# Patient Record
Sex: MALE | Race: WHITE | NOT HISPANIC OR LATINO | Employment: FULL TIME | URBAN - METROPOLITAN AREA
[De-identification: names, ages, dates, MRNs, and addresses within clinical notes are randomized per-mention and may not be internally consistent; named-entity substitution may affect disease eponyms.]

---

## 2019-09-18 ENCOUNTER — HOSPITAL ENCOUNTER (EMERGENCY)
Facility: HOSPITAL | Age: 34
Discharge: HOME | End: 2019-09-18
Attending: EMERGENCY MEDICINE | Admitting: EMERGENCY MEDICINE

## 2019-09-18 ENCOUNTER — APPOINTMENT (OUTPATIENT)
Dept: GENERAL RADIOLOGY | Facility: HOSPITAL | Age: 34
End: 2019-09-18

## 2019-09-18 VITALS
DIASTOLIC BLOOD PRESSURE: 62 MMHG | SYSTOLIC BLOOD PRESSURE: 101 MMHG | RESPIRATION RATE: 18 BRPM | WEIGHT: 218 LBS | TEMPERATURE: 99 F | OXYGEN SATURATION: 95 % | BODY MASS INDEX: 30.52 KG/M2 | HEIGHT: 71 IN | HEART RATE: 74 BPM

## 2019-09-18 DIAGNOSIS — J18.9 PNEUMONIA OF LEFT LOWER LOBE DUE TO INFECTIOUS ORGANISM: Primary | ICD-10-CM

## 2019-09-18 DIAGNOSIS — R07.9 CHEST PAIN, UNSPECIFIED TYPE: ICD-10-CM

## 2019-09-18 LAB
ALBUMIN SERPL-MCNC: 5 G/DL (ref 3.5–5.2)
ALBUMIN/GLOB SERPL: 1.7 G/DL
ALP SERPL-CCNC: 62 U/L (ref 39–117)
ALT SERPL W P-5'-P-CCNC: 14 U/L (ref 1–41)
ANION GAP SERPL CALCULATED.3IONS-SCNC: 12 MMOL/L (ref 5–15)
AST SERPL-CCNC: 22 U/L (ref 1–40)
BASOPHILS # BLD AUTO: 0.03 10*3/MM3 (ref 0–0.2)
BASOPHILS NFR BLD AUTO: 0.4 % (ref 0–1.5)
BILIRUB SERPL-MCNC: 0.6 MG/DL (ref 0.2–1.2)
BUN BLD-MCNC: 11 MG/DL (ref 6–20)
BUN/CREAT SERPL: 10.8 (ref 7–25)
CALCIUM SPEC-SCNC: 9.5 MG/DL (ref 8.6–10.5)
CHLORIDE SERPL-SCNC: 103 MMOL/L (ref 98–107)
CO2 SERPL-SCNC: 26 MMOL/L (ref 22–29)
CREAT BLD-MCNC: 1.02 MG/DL (ref 0.76–1.27)
DEPRECATED RDW RBC AUTO: 42.8 FL (ref 37–54)
EOSINOPHIL # BLD AUTO: 0.1 10*3/MM3 (ref 0–0.4)
EOSINOPHIL NFR BLD AUTO: 1.5 % (ref 0.3–6.2)
ERYTHROCYTE [DISTWIDTH] IN BLOOD BY AUTOMATED COUNT: 12.9 % (ref 12.3–15.4)
GFR SERPL CREATININE-BSD FRML MDRD: 84 ML/MIN/1.73
GLOBULIN UR ELPH-MCNC: 2.9 GM/DL
GLUCOSE BLD-MCNC: 103 MG/DL (ref 65–99)
HCT VFR BLD AUTO: 43.5 % (ref 37.5–51)
HGB BLD-MCNC: 14.4 G/DL (ref 13–17.7)
HOLD SPECIMEN: NORMAL
HOLD SPECIMEN: NORMAL
IMM GRANULOCYTES # BLD AUTO: 0.02 10*3/MM3 (ref 0–0.05)
IMM GRANULOCYTES NFR BLD AUTO: 0.3 % (ref 0–0.5)
LIPASE SERPL-CCNC: 25 U/L (ref 13–60)
LYMPHOCYTES # BLD AUTO: 1.27 10*3/MM3 (ref 0.7–3.1)
LYMPHOCYTES NFR BLD AUTO: 19 % (ref 19.6–45.3)
MCH RBC QN AUTO: 30.1 PG (ref 26.6–33)
MCHC RBC AUTO-ENTMCNC: 33.1 G/DL (ref 31.5–35.7)
MCV RBC AUTO: 91 FL (ref 79–97)
MONOCYTES # BLD AUTO: 0.45 10*3/MM3 (ref 0.1–0.9)
MONOCYTES NFR BLD AUTO: 6.7 % (ref 5–12)
NEUTROPHILS # BLD AUTO: 4.81 10*3/MM3 (ref 1.7–7)
NEUTROPHILS NFR BLD AUTO: 72.1 % (ref 42.7–76)
NRBC BLD AUTO-RTO: 0 /100 WBC (ref 0–0.2)
NT-PROBNP SERPL-MCNC: 112.6 PG/ML (ref 5–450)
PLATELET # BLD AUTO: 243 10*3/MM3 (ref 140–450)
PMV BLD AUTO: 10.1 FL (ref 6–12)
POTASSIUM BLD-SCNC: 4.1 MMOL/L (ref 3.5–5.2)
PROT SERPL-MCNC: 7.9 G/DL (ref 6–8.5)
RBC # BLD AUTO: 4.78 10*6/MM3 (ref 4.14–5.8)
SODIUM BLD-SCNC: 141 MMOL/L (ref 136–145)
TROPONIN T SERPL-MCNC: <0.01 NG/ML (ref 0–0.03)
WBC NRBC COR # BLD: 6.68 10*3/MM3 (ref 3.4–10.8)
WHOLE BLOOD HOLD SPECIMEN: NORMAL
WHOLE BLOOD HOLD SPECIMEN: NORMAL

## 2019-09-18 PROCEDURE — 93005 ELECTROCARDIOGRAM TRACING: CPT | Performed by: EMERGENCY MEDICINE

## 2019-09-18 PROCEDURE — 71045 X-RAY EXAM CHEST 1 VIEW: CPT

## 2019-09-18 PROCEDURE — 84484 ASSAY OF TROPONIN QUANT: CPT | Performed by: EMERGENCY MEDICINE

## 2019-09-18 PROCEDURE — 83690 ASSAY OF LIPASE: CPT | Performed by: EMERGENCY MEDICINE

## 2019-09-18 PROCEDURE — 80053 COMPREHEN METABOLIC PANEL: CPT | Performed by: EMERGENCY MEDICINE

## 2019-09-18 PROCEDURE — 85025 COMPLETE CBC W/AUTO DIFF WBC: CPT | Performed by: EMERGENCY MEDICINE

## 2019-09-18 PROCEDURE — 83880 ASSAY OF NATRIURETIC PEPTIDE: CPT | Performed by: EMERGENCY MEDICINE

## 2019-09-18 PROCEDURE — 99284 EMERGENCY DEPT VISIT MOD MDM: CPT

## 2019-09-18 RX ORDER — CETIRIZINE HYDROCHLORIDE 10 MG/1
10 TABLET ORAL DAILY
COMMUNITY

## 2019-09-18 RX ORDER — SODIUM CHLORIDE 0.9 % (FLUSH) 0.9 %
10 SYRINGE (ML) INJECTION AS NEEDED
Status: DISCONTINUED | OUTPATIENT
Start: 2019-09-18 | End: 2019-09-18 | Stop reason: HOSPADM

## 2019-09-18 RX ORDER — ASPIRIN 81 MG/1
324 TABLET, CHEWABLE ORAL ONCE
Status: COMPLETED | OUTPATIENT
Start: 2019-09-18 | End: 2019-09-18

## 2019-09-18 RX ORDER — HYDROXYZINE HYDROCHLORIDE 25 MG/1
25 TABLET, FILM COATED ORAL 3 TIMES DAILY PRN
Qty: 15 TABLET | Refills: 0 | Status: SHIPPED | OUTPATIENT
Start: 2019-09-18

## 2019-09-18 RX ORDER — AZITHROMYCIN 250 MG/1
250 TABLET, FILM COATED ORAL DAILY
Qty: 6 TABLET | Refills: 0 | Status: SHIPPED | OUTPATIENT
Start: 2019-09-18

## 2019-09-18 RX ADMIN — ASPIRIN 81 MG 324 MG: 81 TABLET ORAL at 11:33

## 2019-09-18 NOTE — ED PROVIDER NOTES
Subjective   Carlos Glasgow is a 33 y.o male who presents to the ED with complaints of chest pain. He reports he began experiencing left-sided chest pain with an onset yesterday. He describes it as a tightness sensation. He also complains of anxiety, shortness of breath, generalized weakness, light-headedness, dizziness, myalgias, decreased appetite, and a numbness/tingling sensation in his hands. He denies fever, nausea, vomiting, diarrhea, cough, sore throat, congestion, urgency, vision changes, tinnitus, and headache. He has a history of anxiety attacks and states his symptoms feel somewhat similar. There are no other acute symptoms at this time.        History provided by:  Patient  Chest Pain   Pain location:  L chest  Pain quality: tightness    Pain radiates to:  Does not radiate  Pain severity:  Moderate  Onset quality:  Sudden  Timing:  Constant  Chronicity:  New  Associated symptoms: dizziness, numbness, shortness of breath and weakness (generalized)    Associated symptoms: no fever, no headache, no nausea and no vomiting    Dizziness:     Severity:  Mild    Timing:  Constant    Progression:  Unchanged  Shortness of breath:     Severity:  Mild    Onset quality:  Sudden    Timing:  Constant    Progression:  Unchanged  Weakness:     Severity:  Mild    Onset quality:  Sudden    Timing:  Constant    Progression:  Unchanged    Chronicity:  New      Review of Systems   Constitutional: Positive for appetite change (decreased). Negative for fever.   HENT: Negative for congestion, sore throat and tinnitus.    Eyes: Negative for visual disturbance.   Respiratory: Positive for chest tightness and shortness of breath.    Cardiovascular: Positive for chest pain.   Gastrointestinal: Negative for diarrhea, nausea and vomiting.   Genitourinary: Negative for urgency.   Musculoskeletal: Positive for myalgias.   Neurological: Positive for dizziness, weakness (generalized), light-headedness and numbness. Negative for  headaches.   Psychiatric/Behavioral: The patient is nervous/anxious.    All other systems reviewed and are negative.      Past Medical History:   Diagnosis Date   • Seasonal allergies        No Known Allergies    Past Surgical History:   Procedure Laterality Date   • CERVICAL FUSION         History reviewed. No pertinent family history.    Social History     Socioeconomic History   • Marital status:      Spouse name: Not on file   • Number of children: Not on file   • Years of education: Not on file   • Highest education level: Not on file   Tobacco Use   • Smoking status: Never Smoker   • Smokeless tobacco: Never Used   Substance and Sexual Activity   • Alcohol use: Yes     Comment: socially   • Drug use: No         Objective   Physical Exam   Constitutional: He is oriented to person, place, and time. He appears well-developed and well-nourished. No distress.   HENT:   Head: Normocephalic and atraumatic.   Nose: Nose normal.   Eyes: Conjunctivae are normal. No scleral icterus.   Neck: Normal range of motion. Neck supple.   Cardiovascular: Normal rate, regular rhythm, normal heart sounds and intact distal pulses.   No murmur heard.  Pulmonary/Chest: Effort normal and breath sounds normal. No respiratory distress.   Abdominal: Soft. There is no tenderness.   Musculoskeletal: Normal range of motion. He exhibits no edema.   Neurological: He is alert and oriented to person, place, and time.   Skin: Skin is warm and dry.   Psychiatric: His behavior is normal. His mood appears anxious.   Nursing note and vitals reviewed.      Procedures         ED Course     No results found for this or any previous visit (from the past 24 hour(s)).  Note: In addition to lab results from this visit, the labs listed above may include labs taken at another facility or during a different encounter within the last 24 hours. Please correlate lab times with ED admission and discharge times for further clarification of the services  "performed during this visit.    XR Chest 1 View   Final Result   1.  Hazy left lower lobe airspace disease suggested, although may be   positional. Consider follow-up dedicated PA and lateral chest radiograph   for further evaluation.   2.  Heart is top normal in size.       D:  09/18/2019   E:  09/18/2019       This report was finalized on 9/18/2019 5:54 PM by Dr. Gonzalez Bull MD.            Vitals:    09/18/19 1116 09/18/19 1200 09/18/19 1332   BP: 142/91 122/85 101/62   BP Location:   Left arm   Patient Position:   Lying   Pulse: 96 82 74   Resp: 20  18   Temp: 99 °F (37.2 °C)     SpO2: 98% 97% 95%   Weight: 98.9 kg (218 lb)     Height: 180.3 cm (71\")       Medications   aspirin chewable tablet 324 mg (324 mg Oral Given 9/18/19 1133)     ECG/EMG Results (last 24 hours)     Procedure Component Value Units Date/Time    ECG 12 Lead [[id number]] Collected:  09/18/19 1126     Updated:  09/18/19 1146        ECG 12 Lead   Final Result   Test Reason : CP   Blood Pressure : **/** mmHG   Vent. Rate : 075 BPM     Atrial Rate : 075 BPM      P-R Int : 170 ms          QRS Dur : 094 ms       QT Int : 388 ms       P-R-T Axes : 028 001 017 degrees      QTc Int : 433 ms      Normal sinus rhythm   Normal ECG   No previous ECGs available   Confirmed by BERNADINE VOGEL ([id number]) on 9/18/2019 10:32:56 PM      Referred By:  ERMD           Confirmed By:BERNADINE VOGEL                        [facility]  Number of Diagnoses or Management Options  Chest pain, unspecified type: new and requires workup  Pneumonia of left lower lobe due to infectious organism (CMS/HCC): new and requires workup  Diagnosis management comments: Chest x-ray is consistent with left lower lobe pneumonia.  Antibiotics given here in the ER.    The abdomen remains soft and nontender with no peritoneal signs.    No significant lab abnormalities.    Vital signs have remained stable.    Patient will be discharged with a course of antibiotics for treatment pneumonia and the " advised to rest and drink plenty of fluids.         Amount and/or Complexity of Data Reviewed  Clinical lab tests: ordered and reviewed  Tests in the radiology section of CPT®: ordered and reviewed  Obtain history from someone other than the patient: yes  Review and summarize past medical records: yes  Independent visualization of images, tracings, or specimens: yes        Final diagnoses:   Pneumonia of left lower lobe due to infectious organism (CMS/HCC)   Chest pain, unspecified type       Documentation assistance provided by wm Bonilla.  Information recorded by the scribe was done at my direction and has been verified and validated by me.     Cristian Bonilla  09/18/19 1246       Kendall Arciniega MD  09/28/19 3458       Kendall Arciniega MD  09/28/19 0469